# Patient Record
Sex: FEMALE | Race: WHITE | NOT HISPANIC OR LATINO
[De-identification: names, ages, dates, MRNs, and addresses within clinical notes are randomized per-mention and may not be internally consistent; named-entity substitution may affect disease eponyms.]

---

## 2018-10-29 ENCOUNTER — RESULT REVIEW (OUTPATIENT)
Age: 53
End: 2018-10-29

## 2019-06-17 ENCOUNTER — TRANSCRIPTION ENCOUNTER (OUTPATIENT)
Age: 54
End: 2019-06-17

## 2020-05-11 ENCOUNTER — RESULT REVIEW (OUTPATIENT)
Age: 55
End: 2020-05-11

## 2021-06-23 ENCOUNTER — RESULT REVIEW (OUTPATIENT)
Age: 56
End: 2021-06-23

## 2022-07-14 ENCOUNTER — RESULT REVIEW (OUTPATIENT)
Age: 57
End: 2022-07-14

## 2022-11-10 ENCOUNTER — APPOINTMENT (OUTPATIENT)
Dept: ORTHOPEDIC SURGERY | Facility: CLINIC | Age: 57
End: 2022-11-10

## 2022-11-10 VITALS — BODY MASS INDEX: 25.77 KG/M2 | WEIGHT: 180 LBS | HEIGHT: 70 IN | TEMPERATURE: 97.9 F

## 2022-11-10 PROCEDURE — 99204 OFFICE O/P NEW MOD 45 MIN: CPT

## 2022-11-10 RX ORDER — METHYLPREDNISOLONE 4 MG/1
4 TABLET ORAL
Qty: 2 | Refills: 1 | Status: ACTIVE | COMMUNITY
Start: 2022-11-10 | End: 1900-01-01

## 2022-11-10 NOTE — DISCUSSION/SUMMARY
[de-identified] : Left trochanteric bursitis\par Left lumbar radiculopathy\par All options discussed. \par MDP\par PT Lumbar and Left Hip, Rx provided\par If no better in 2 weeks, consider MRI versus injection. \par F/u 2 weeks. \par All options discussed including rest,medicine,home exercise, acupuncture, Chiropractic care, physical therapy, pain management and last resort surgery. All questions were answered, all alternatives discussed and the patient is in complete agreement with the plan. Follow up appointment as instructed. If any issues arise, the patient will call or come in sooner.\par Her mother agrees with the plan. \par

## 2022-11-10 NOTE — ADDENDUM
[FreeTextEntry1] : This note was written by Yessy Holland on 11/10/2022 acting as scribe for Dr. Heron Phillip M.D.  \par \par I, Heron Phillip MD, have read and attest that all the information, medical decision making and discharge instructions within are true and accurate.\par

## 2022-11-10 NOTE — HISTORY OF PRESENT ILLNESS
[Stable] : stable [de-identified] : Pt is a 57 year old female who presents to the office today for an initial evaluation of left lumbar radiculopathy without any specific cause or trauma.. \par She is complaining of one month of left buttock and leg pain/tingling.  \par Her tingling goes into her foot. \par She has done stretching at home and takes aleve with mild improvement. \par Denies any change with bowel or bladder. \par Denies any fevers, chills, sweats, infection, or weight loss.\par No fever, chills, sweats, nausea/vomiting. No bowel or bladder dysfunction, no recent weight loss or gain. No night pain. This history is in addition to the intake form that I personally reviewed.\par

## 2022-11-10 NOTE — PHYSICAL EXAM
[Normal] : Gait: normal [Yan's Sign] : negative Yan's sign [Pronator Drift] : negative pronator drift [SLR] : negative straight leg raise [de-identified] : 5 out of 5 motor strength,sensation is intact and symmetrical full range of motion flexion extension and rotation, no palpatory tenderness full range of motion of hips knees shoulders and elbows (all four extremities), no atrophy, negative straight leg raise, no pathological reflexes, no swelling, normal ambulation, no apparent distress skin intact, no palpable lymph nodes, no upper or lower extremity instability, alert and oriented x 3 and normal mood. Normal finger-to nose test.\par Pain over the left trochanteric region and some pain with range of motion of her left hip.\par

## 2022-12-05 ENCOUNTER — APPOINTMENT (OUTPATIENT)
Dept: ORTHOPEDIC SURGERY | Facility: CLINIC | Age: 57
End: 2022-12-05

## 2022-12-05 VITALS — TEMPERATURE: 97.2 F | BODY MASS INDEX: 26.48 KG/M2 | HEIGHT: 70 IN | WEIGHT: 185 LBS

## 2022-12-05 PROCEDURE — 99214 OFFICE O/P EST MOD 30 MIN: CPT

## 2022-12-19 ENCOUNTER — APPOINTMENT (OUTPATIENT)
Dept: ORTHOPEDIC SURGERY | Facility: CLINIC | Age: 57
End: 2022-12-19

## 2022-12-19 VITALS — WEIGHT: 185 LBS | BODY MASS INDEX: 26.48 KG/M2 | HEIGHT: 70 IN

## 2022-12-19 PROCEDURE — 99214 OFFICE O/P EST MOD 30 MIN: CPT

## 2023-01-30 ENCOUNTER — APPOINTMENT (OUTPATIENT)
Dept: ORTHOPEDIC SURGERY | Facility: CLINIC | Age: 58
End: 2023-01-30
Payer: COMMERCIAL

## 2023-01-30 VITALS — WEIGHT: 180 LBS | HEIGHT: 70 IN | BODY MASS INDEX: 25.77 KG/M2

## 2023-01-30 DIAGNOSIS — M51.36 OTHER INTERVERTEBRAL DISC DEGENERATION, LUMBAR REGION: ICD-10-CM

## 2023-01-30 PROCEDURE — 99214 OFFICE O/P EST MOD 30 MIN: CPT

## 2023-01-31 ENCOUNTER — APPOINTMENT (OUTPATIENT)
Dept: ORTHOPEDIC SURGERY | Facility: CLINIC | Age: 58
End: 2023-01-31
Payer: COMMERCIAL

## 2023-01-31 VITALS
OXYGEN SATURATION: 98 % | HEART RATE: 86 BPM | WEIGHT: 180 LBS | HEIGHT: 70 IN | DIASTOLIC BLOOD PRESSURE: 80 MMHG | BODY MASS INDEX: 25.77 KG/M2 | SYSTOLIC BLOOD PRESSURE: 120 MMHG

## 2023-01-31 DIAGNOSIS — M54.16 RADICULOPATHY, LUMBAR REGION: ICD-10-CM

## 2023-01-31 PROCEDURE — 99204 OFFICE O/P NEW MOD 45 MIN: CPT | Mod: 25

## 2023-01-31 PROCEDURE — 20553 NJX 1/MLT TRIGGER POINTS 3/>: CPT

## 2023-02-08 ENCOUNTER — RX RENEWAL (OUTPATIENT)
Age: 58
End: 2023-02-08

## 2023-02-08 RX ORDER — DICLOFENAC SODIUM 75 MG/1
75 TABLET, DELAYED RELEASE ORAL
Qty: 60 | Refills: 1 | Status: ACTIVE | COMMUNITY
Start: 2022-12-05 | End: 1900-01-01

## 2023-02-16 NOTE — HISTORY OF PRESENT ILLNESS
[de-identified] : DAMARI  is a 57 year old F who presents with low back pain. Pain is primarily located at the buttock region on the left.  It began over 3 months ago, without injury or trauma. Patient was referred by Dr Phillip for left piriformis trigger point injections.\par Pain is described as sharp  in nature, 7/10 in intensity, worse at nights, prolong sitting and stairs.  \par Patient states that she has done over 6 weeks of physical therapy with at least 70% improvement in her symptoms and she has continued with home exercises provided by the therapist. However she is still having pain.\par Patient reported that she has had left lower extremity pain, numbness lateral left thigh and tingling that has improved. Now the pain is primarily located left buttocks region\par She denies prior injury, surgery or injection to the area\par Denies bowel/bladder changes, fevers, chills, saddle anesthesia.

## 2023-02-16 NOTE — ADDENDUM
[FreeTextEntry1] : I José Strickland, BHUPENDRA, assisted in the history and documentation of the patient with Dr Keyla James on 1/31/23

## 2023-02-16 NOTE — DISCUSSION/SUMMARY
[de-identified] : Carol presents with gluteal pain. Trigger point injections were provided in the office today without complication. Follow up as needed. \par \par Keyla James MD, EdM\par [facility]\par Department of Orthopedics

## 2023-02-16 NOTE — PROCEDURE
[de-identified] : Injection: Trigger Point Injection.\par Indication: Myofascial trigger point of the left gluteal musculature\par A discussion was had with the patient regarding this procedure and all questions were answered. All risks, benefits and alternatives were discussed. These included but were not limited to bleeding, infection, allergic reaction, and possibility of pneumothorax.  A timeout was done to ensure correct side and location of identified trigger points and pt agreed to the procedure.   Alcohol was used to clean the skin. Ethyl chloride spray was then used as a topical anesthetic. A 5cc syringe was filled with 4cc of 1% lidocaine without epi and 1cc of 40mg/ml methylprednisolone.  A 25-gauge 1.5" needle was used to inject 1 cc into each trigger point . A sterile bandage was then applied. The patient tolerated the procedure well and there were no complications.\par

## 2023-02-16 NOTE — PHYSICAL EXAM
[de-identified] : Constitutional: Well-nourished, well-developed, No acute distress\par Respiratory: Good respiratory effort, no SOB\par Lymphatic: No regional lymphadenopathy, no lymphedema\par Psychiatric: Pleasant and normal affect, alert and oriented x3\par Skin: Clean dry and intact B/L UE/LE\par Musculoskeletal: normal except where as noted in regional exam\par \par LUMBAR SPINE\par Inspection reveals no scoliosis\par Range of motion testing demonstrates pain with flexion, full ROM\par Facet loading maneuver negative\par - tenderness to palpation of lumbar paraspinal muscles. \par Seated slump test : negative \par Straight leg raise : negative \par \par HIPS/PELVIS -\par Symmetric in standing and lying \par Hip maneuvers:\par Range of motion full and pain free\par passive hip impingement sign negative\par HOWIE negative \par Log roll negative\par Sacroiliac maneuvers:no TTP of  bilateral PSIS \par AP glide negative\par Justino's negative\par Resisted active straight leg raise negative\par + TTP ischial tuberosity on left \par + TTP of the left  buttock, greater trochanters, IT band \par NEURO - Normal bulk and tone \par LE strength 5/5 including hip flexion, knee flexion, knee extension, ankle dorsiflexion, ankle plantarflexion, eversion, and EHL \par Toe-walking and heel-walking intact\par Sensation - intact to light touch in bilateral lower extremities. \par LE Reflexes 2+ patellar and Achilles reflexes bilaterally \par no Clonus\par Coordination was age appropriate and intact in all 4 limbs. \par GAIT - Normal base, normal stride length, non-antalgic\par

## 2023-07-31 ENCOUNTER — RX RENEWAL (OUTPATIENT)
Age: 58
End: 2023-07-31

## 2023-07-31 RX ORDER — TIZANIDINE 4 MG/1
4 TABLET ORAL
Qty: 90 | Refills: 1 | Status: ACTIVE | COMMUNITY
Start: 2023-01-30 | End: 1900-01-01

## 2024-03-06 ENCOUNTER — RX RENEWAL (OUTPATIENT)
Age: 59
End: 2024-03-06

## 2024-03-25 ENCOUNTER — APPOINTMENT (OUTPATIENT)
Dept: ORTHOPEDIC SURGERY | Facility: CLINIC | Age: 59
End: 2024-03-25
Payer: COMMERCIAL

## 2024-03-25 VITALS
SYSTOLIC BLOOD PRESSURE: 128 MMHG | WEIGHT: 185 LBS | BODY MASS INDEX: 26.48 KG/M2 | HEIGHT: 70 IN | DIASTOLIC BLOOD PRESSURE: 82 MMHG | HEART RATE: 66 BPM

## 2024-03-25 DIAGNOSIS — M17.12 UNILATERAL PRIMARY OSTEOARTHRITIS, LEFT KNEE: ICD-10-CM

## 2024-03-25 PROCEDURE — 99214 OFFICE O/P EST MOD 30 MIN: CPT

## 2024-03-25 PROCEDURE — 73564 X-RAY EXAM KNEE 4 OR MORE: CPT | Mod: LT

## 2024-03-25 NOTE — DISCUSSION/SUMMARY
[de-identified] : The patient has osteoarthritis of the left knee.  I have discussed the pathology, natural history and treatment options with her.  She does have laxity of the ACL but I do not think this is her problem.  The treatment for her arthritis will be diclofenac as she is currently taking.  She will do home exercises.  She will continue her general activities.  She will return if symptoms progress.

## 2024-03-25 NOTE — HISTORY OF PRESENT ILLNESS
[de-identified] : 58-year-old female presents for evaluation of left knee pain x 2-3 weeks. Denies trauma or injury. She complains of throbbing pain over the lateral knee worse while exercising. She feels the knee stiffens at times. She has tried Diclofenac with some relief. She has a history of left knee arthroscopy in 2011. She has had cortisone injections in the left knee periodically which have helped.

## 2024-03-25 NOTE — PHYSICAL EXAM
[LE] : Sensory: Intact in bilateral lower extremities [DP] : dorsalis pedis 2+ and symmetric bilaterally [PT] : posterior tibial 2+ and symmetric bilaterally [Normal] : Alert and in no acute distress [Poor Appearance] : well-appearing [Acute Distress] : not in acute distress [Obese] : not obese [de-identified] : The patient has no respiratory distress. Mood and affect are normal. The patient is alert and oriented to person, place and time. There is no pain with active or passive motion of the hips.  There is no tenderness of either hip. Examination of the left knee demonstrates no effusion.  There is mild medial swelling.  Quadriceps and hamstring function are intact.  There is no instability of collateral ligaments.  There is 1-2+ anterior instability.  There is medial tenderness.  There is medial pain with motion.  The calves are soft and nontender.  The skin is intact.  There is no lymphedema.  Range of motion 0 to 110 degrees on the left, 0 to 115 degrees on the right. [de-identified] : AP, lateral, tunnel and sunrise x-rays of the left knee taken today demonstrate moderate tricompartmental osteoarthritis.  There has been some progression from x-rays taken in the past.  The single AP view of the right knee demonstrates osteoarthritis as well and has what appears to be an enchondroma in the distal right femur.  This is unchanged from previous x-rays.

## 2024-06-18 RX ORDER — DICLOFENAC SODIUM 75 MG/1
75 TABLET, DELAYED RELEASE ORAL
Qty: 60 | Refills: 0 | Status: ACTIVE | COMMUNITY
Start: 2024-01-31 | End: 1900-01-01

## 2024-12-16 ENCOUNTER — APPOINTMENT (OUTPATIENT)
Dept: ORTHOPEDIC SURGERY | Facility: CLINIC | Age: 59
End: 2024-12-16
Payer: COMMERCIAL

## 2024-12-16 VITALS
SYSTOLIC BLOOD PRESSURE: 130 MMHG | WEIGHT: 185 LBS | HEART RATE: 60 BPM | DIASTOLIC BLOOD PRESSURE: 85 MMHG | BODY MASS INDEX: 26.48 KG/M2 | HEIGHT: 70 IN

## 2024-12-16 DIAGNOSIS — M77.11 LATERAL EPICONDYLITIS, RIGHT ELBOW: ICD-10-CM

## 2024-12-16 DIAGNOSIS — M17.12 UNILATERAL PRIMARY OSTEOARTHRITIS, LEFT KNEE: ICD-10-CM

## 2024-12-16 PROCEDURE — 73080 X-RAY EXAM OF ELBOW: CPT | Mod: RT

## 2024-12-16 PROCEDURE — 99214 OFFICE O/P EST MOD 30 MIN: CPT

## 2024-12-19 ENCOUNTER — APPOINTMENT (OUTPATIENT)
Dept: ORTHOPEDIC SURGERY | Facility: CLINIC | Age: 59
End: 2024-12-19
Payer: COMMERCIAL

## 2024-12-19 ENCOUNTER — NON-APPOINTMENT (OUTPATIENT)
Age: 59
End: 2024-12-19

## 2024-12-19 VITALS — BODY MASS INDEX: 26.48 KG/M2 | HEIGHT: 70 IN | WEIGHT: 185 LBS

## 2024-12-19 DIAGNOSIS — M54.16 RADICULOPATHY, LUMBAR REGION: ICD-10-CM

## 2024-12-19 PROCEDURE — 99214 OFFICE O/P EST MOD 30 MIN: CPT

## 2024-12-19 RX ORDER — TIZANIDINE 4 MG/1
4 TABLET ORAL
Qty: 90 | Refills: 0 | Status: ACTIVE | COMMUNITY
Start: 2024-12-19 | End: 1900-01-01

## 2025-03-17 ENCOUNTER — RX RENEWAL (OUTPATIENT)
Age: 60
End: 2025-03-17

## 2025-03-28 ENCOUNTER — APPOINTMENT (OUTPATIENT)
Dept: ORTHOPEDIC SURGERY | Facility: CLINIC | Age: 60
End: 2025-03-28
Payer: COMMERCIAL

## 2025-03-28 VITALS
DIASTOLIC BLOOD PRESSURE: 77 MMHG | HEIGHT: 70 IN | HEART RATE: 59 BPM | SYSTOLIC BLOOD PRESSURE: 124 MMHG | WEIGHT: 180 LBS | BODY MASS INDEX: 25.77 KG/M2

## 2025-03-28 DIAGNOSIS — M19.042 PRIMARY OSTEOARTHRITIS, LEFT HAND: ICD-10-CM

## 2025-03-28 PROCEDURE — 20605 DRAIN/INJ JOINT/BURSA W/O US: CPT | Mod: LT

## 2025-03-28 PROCEDURE — 73130 X-RAY EXAM OF HAND: CPT | Mod: LT

## 2025-03-28 PROCEDURE — 99214 OFFICE O/P EST MOD 30 MIN: CPT | Mod: 25

## 2025-07-28 ENCOUNTER — APPOINTMENT (OUTPATIENT)
Dept: ORTHOPEDIC SURGERY | Facility: CLINIC | Age: 60
End: 2025-07-28
Payer: COMMERCIAL

## 2025-07-28 ENCOUNTER — NON-APPOINTMENT (OUTPATIENT)
Age: 60
End: 2025-07-28

## 2025-07-28 VITALS
HEART RATE: 62 BPM | WEIGHT: 202 LBS | BODY MASS INDEX: 28.92 KG/M2 | DIASTOLIC BLOOD PRESSURE: 71 MMHG | SYSTOLIC BLOOD PRESSURE: 124 MMHG | HEIGHT: 70 IN

## 2025-07-28 PROCEDURE — 73502 X-RAY EXAM HIP UNI 2-3 VIEWS: CPT | Mod: RT

## 2025-07-28 PROCEDURE — 73564 X-RAY EXAM KNEE 4 OR MORE: CPT | Mod: RT

## 2025-07-28 PROCEDURE — 99215 OFFICE O/P EST HI 40 MIN: CPT | Mod: 25

## 2025-07-28 PROCEDURE — 20610 DRAIN/INJ JOINT/BURSA W/O US: CPT | Mod: RT

## 2025-08-11 ENCOUNTER — APPOINTMENT (OUTPATIENT)
Dept: ORTHOPEDIC SURGERY | Facility: CLINIC | Age: 60
End: 2025-08-11
Payer: COMMERCIAL

## 2025-08-11 VITALS
WEIGHT: 186 LBS | SYSTOLIC BLOOD PRESSURE: 115 MMHG | BODY MASS INDEX: 26.63 KG/M2 | HEIGHT: 70 IN | HEART RATE: 64 BPM | DIASTOLIC BLOOD PRESSURE: 77 MMHG

## 2025-08-11 DIAGNOSIS — M16.11 UNILATERAL PRIMARY OSTEOARTHRITIS, RIGHT HIP: ICD-10-CM

## 2025-08-11 DIAGNOSIS — M17.0 BILATERAL PRIMARY OSTEOARTHRITIS OF KNEE: ICD-10-CM

## 2025-08-11 PROCEDURE — 99214 OFFICE O/P EST MOD 30 MIN: CPT

## 2025-08-11 RX ORDER — NABUMETONE 750 MG/1
750 TABLET, FILM COATED ORAL
Qty: 60 | Refills: 2 | Status: ACTIVE | COMMUNITY
Start: 2025-08-11 | End: 1900-01-01

## 2025-08-19 ENCOUNTER — OUTPATIENT (OUTPATIENT)
Dept: OUTPATIENT SERVICES | Facility: HOSPITAL | Age: 60
LOS: 1 days | End: 2025-08-19
Payer: COMMERCIAL

## 2025-08-19 ENCOUNTER — RESULT REVIEW (OUTPATIENT)
Age: 60
End: 2025-08-19

## 2025-08-19 ENCOUNTER — APPOINTMENT (OUTPATIENT)
Dept: ULTRASOUND IMAGING | Facility: CLINIC | Age: 60
End: 2025-08-19
Payer: COMMERCIAL

## 2025-08-19 DIAGNOSIS — M16.11 UNILATERAL PRIMARY OSTEOARTHRITIS, RIGHT HIP: ICD-10-CM

## 2025-08-19 PROCEDURE — 20611 DRAIN/INJ JOINT/BURSA W/US: CPT | Mod: RT

## 2025-08-19 PROCEDURE — 20611 DRAIN/INJ JOINT/BURSA W/US: CPT

## 2025-08-20 ENCOUNTER — NON-APPOINTMENT (OUTPATIENT)
Age: 60
End: 2025-08-20

## 2025-09-09 ENCOUNTER — APPOINTMENT (OUTPATIENT)
Dept: ORTHOPEDIC SURGERY | Facility: CLINIC | Age: 60
End: 2025-09-09
Payer: COMMERCIAL

## 2025-09-09 VITALS
HEIGHT: 70 IN | DIASTOLIC BLOOD PRESSURE: 89 MMHG | WEIGHT: 190 LBS | BODY MASS INDEX: 27.2 KG/M2 | HEART RATE: 87 BPM | SYSTOLIC BLOOD PRESSURE: 143 MMHG

## 2025-09-09 DIAGNOSIS — M16.11 UNILATERAL PRIMARY OSTEOARTHRITIS, RIGHT HIP: ICD-10-CM

## 2025-09-09 DIAGNOSIS — M17.0 BILATERAL PRIMARY OSTEOARTHRITIS OF KNEE: ICD-10-CM

## 2025-09-09 PROCEDURE — 99213 OFFICE O/P EST LOW 20 MIN: CPT

## 2025-09-15 ENCOUNTER — APPOINTMENT (OUTPATIENT)
Dept: MRI IMAGING | Facility: CLINIC | Age: 60
End: 2025-09-15
Payer: COMMERCIAL

## 2025-09-15 PROCEDURE — 73721 MRI JNT OF LWR EXTRE W/O DYE: CPT | Mod: 26,RT
